# Patient Record
Sex: FEMALE | Race: BLACK OR AFRICAN AMERICAN | NOT HISPANIC OR LATINO | URBAN - METROPOLITAN AREA
[De-identification: names, ages, dates, MRNs, and addresses within clinical notes are randomized per-mention and may not be internally consistent; named-entity substitution may affect disease eponyms.]

---

## 2020-11-19 ENCOUNTER — TELEPHONE ENCOUNTER (OUTPATIENT)
Dept: URBAN - METROPOLITAN AREA CLINIC 113 | Facility: CLINIC | Age: 50
End: 2020-11-19

## 2020-11-19 VITALS — HEIGHT: 63 IN | WEIGHT: 145 LBS | BODY MASS INDEX: 25.69 KG/M2

## 2020-11-19 RX ORDER — POLYETHYLENE GLYCOL 3350, SODIUM CHLORIDE, SODIUM BICARBONATE, POTASSIUM CHLORIDE 420; 11.2; 5.72; 1.48 G/4L; G/4L; G/4L; G/4L
AS DIRECTED POWDER, FOR SOLUTION ORAL
Qty: 1 | Refills: 0 | OUTPATIENT
Start: 2020-11-19 | End: 2020-11-20

## 2020-11-24 ENCOUNTER — LAB OUTSIDE AN ENCOUNTER (OUTPATIENT)
Dept: URBAN - METROPOLITAN AREA CLINIC 113 | Facility: CLINIC | Age: 50
End: 2020-11-24

## 2020-12-02 ENCOUNTER — ERX REFILL RESPONSE (OUTPATIENT)
Dept: URBAN - METROPOLITAN AREA CLINIC 113 | Facility: CLINIC | Age: 50
End: 2020-12-02

## 2020-12-02 RX ORDER — POLYETHYLENE GLYCOL 3350, SODIUM SULFATE, SODIUM CHLORIDE, POTASSIUM CHLORIDE, ASCORBIC ACID, SODIUM ASCORBATE 7.5-2.691G
PLEASE SPECIFY DIRECTIONS, REFILLS AND QUANTITY KIT ORAL
Qty: 1 PACKET | Refills: 0 | OUTPATIENT

## 2020-12-02 RX ORDER — POLYETHYLENE GLYCOL 3350, SODIUM CHLORIDE, SODIUM BICARBONATE, POTASSIUM CHLORIDE 420; 11.2; 5.72; 1.48 G/4L; G/4L; G/4L; G/4L
AS DIRECTED POWDER, FOR SOLUTION ORAL
Qty: 4000 | Refills: 0 | OUTPATIENT

## 2020-12-03 ENCOUNTER — OFFICE VISIT (OUTPATIENT)
Dept: URBAN - METROPOLITAN AREA SURGERY CENTER 25 | Facility: SURGERY CENTER | Age: 50
End: 2020-12-03

## 2020-12-03 ENCOUNTER — ERX REFILL RESPONSE (OUTPATIENT)
Dept: URBAN - METROPOLITAN AREA CLINIC 113 | Facility: CLINIC | Age: 50
End: 2020-12-03

## 2020-12-03 RX ORDER — POLYETHYLENE GLYCOL 3350, SODIUM SULFATE, SODIUM CHLORIDE, POTASSIUM CHLORIDE, ASCORBIC ACID, SODIUM ASCORBATE 7.5-2.691G
PLEASE SPECIFY DIRECTIONS, REFILLS AND QUANTITY KIT ORAL
Qty: 1 | Refills: 0 | OUTPATIENT

## 2020-12-03 RX ORDER — SODIUM SULFATE, POTASSIUM SULFATE, MAGNESIUM SULFATE 17.5; 3.13; 1.6 G/ML; G/ML; G/ML
USE AS DIRECTED FOR PREP SOLUTION, CONCENTRATE ORAL
Qty: 354 MILLILITER | Refills: 0 | OUTPATIENT

## 2020-12-16 ENCOUNTER — TELEPHONE ENCOUNTER (OUTPATIENT)
Dept: URBAN - METROPOLITAN AREA CLINIC 113 | Facility: CLINIC | Age: 50
End: 2020-12-16

## 2020-12-16 ENCOUNTER — OFFICE VISIT (OUTPATIENT)
Dept: URBAN - METROPOLITAN AREA MEDICAL CENTER 19 | Facility: MEDICAL CENTER | Age: 50
End: 2020-12-16
Payer: COMMERCIAL

## 2020-12-16 DIAGNOSIS — Z12.11 COLON CANCER SCREENING: ICD-10-CM

## 2020-12-16 PROCEDURE — 992 APS NON BILLABLE: Performed by: INTERNAL MEDICINE

## 2020-12-16 RX ORDER — SODIUM SULFATE, POTASSIUM SULFATE, MAGNESIUM SULFATE 17.5; 3.13; 1.6 G/ML; G/ML; G/ML
USE AS DIRECTED FOR PREP SOLUTION, CONCENTRATE ORAL
Qty: 354 MILLILITER | Refills: 0 | Status: ACTIVE | COMMUNITY

## 2020-12-16 RX ORDER — POLYETHYLENE GLYCOL 3350, SODIUM SULFATE, SODIUM CHLORIDE, POTASSIUM CHLORIDE, ASCORBIC ACID, SODIUM ASCORBATE 7.5-2.691G
PLEASE SPECIFY DIRECTIONS, REFILLS AND QUANTITY KIT ORAL
Qty: 1 PACKET | Refills: 0 | Status: ACTIVE | COMMUNITY

## 2023-03-22 ENCOUNTER — OFFICE VISIT (OUTPATIENT)
Dept: URBAN - METROPOLITAN AREA CLINIC 107 | Facility: CLINIC | Age: 53
End: 2023-03-22

## 2023-03-31 ENCOUNTER — WEB ENCOUNTER (OUTPATIENT)
Dept: URBAN - METROPOLITAN AREA CLINIC 107 | Facility: CLINIC | Age: 53
End: 2023-03-31

## 2023-03-31 ENCOUNTER — OFFICE VISIT (OUTPATIENT)
Dept: URBAN - METROPOLITAN AREA CLINIC 107 | Facility: CLINIC | Age: 53
End: 2023-03-31
Payer: COMMERCIAL

## 2023-03-31 VITALS
DIASTOLIC BLOOD PRESSURE: 105 MMHG | SYSTOLIC BLOOD PRESSURE: 142 MMHG | HEIGHT: 63 IN | HEART RATE: 85 BPM | BODY MASS INDEX: 19.49 KG/M2 | TEMPERATURE: 98 F | WEIGHT: 110 LBS

## 2023-03-31 DIAGNOSIS — F10.20 ALCOHOL DEPENDENCE, UNCOMPLICATED: ICD-10-CM

## 2023-03-31 DIAGNOSIS — K86.2 PANCREATIC CYST: ICD-10-CM

## 2023-03-31 DIAGNOSIS — F19.10 POLYSUBSTANCE ABUSE: ICD-10-CM

## 2023-03-31 DIAGNOSIS — K86.0 ALCOHOL-INDUCED CHRONIC PANCREATITIS: ICD-10-CM

## 2023-03-31 PROBLEM — 445273005: Status: ACTIVE | Noted: 2023-03-31

## 2023-03-31 PROBLEM — 31258000: Status: ACTIVE | Noted: 2023-03-31

## 2023-03-31 PROBLEM — 154211000119108: Status: ACTIVE | Noted: 2023-03-31

## 2023-03-31 PROBLEM — 66590003: Status: ACTIVE | Noted: 2023-03-31

## 2023-03-31 PROCEDURE — 99204 OFFICE O/P NEW MOD 45 MIN: CPT | Performed by: INTERNAL MEDICINE

## 2023-03-31 RX ORDER — POLYETHYLENE GLYCOL 3350, SODIUM SULFATE, SODIUM CHLORIDE, POTASSIUM CHLORIDE, ASCORBIC ACID, SODIUM ASCORBATE 7.5-2.691G
PLEASE SPECIFY DIRECTIONS, REFILLS AND QUANTITY KIT ORAL
Qty: 1 PACKET | Refills: 0 | Status: ON HOLD | COMMUNITY

## 2023-03-31 RX ORDER — DICYCLOMINE HYDROCHLORIDE 10 MG/1
2 CAPSULES CAPSULE ORAL THREE TIMES A DAY
Status: ACTIVE | COMMUNITY

## 2023-03-31 RX ORDER — PANCRELIPASE 36000; 180000; 114000 [USP'U]/1; [USP'U]/1; [USP'U]/1
AS DIRECTED CAPSULE, DELAYED RELEASE PELLETS ORAL
Qty: 250 | Refills: 4 | OUTPATIENT
Start: 2023-03-31 | End: 2023-08-28

## 2023-03-31 RX ORDER — SODIUM SULFATE, POTASSIUM SULFATE, MAGNESIUM SULFATE 17.5; 3.13; 1.6 G/ML; G/ML; G/ML
USE AS DIRECTED FOR PREP SOLUTION, CONCENTRATE ORAL
Qty: 354 MILLILITER | Refills: 0 | Status: ON HOLD | COMMUNITY

## 2023-03-31 NOTE — HPI-TODAY'S VISIT:
Ms. Love is a 52-year-old female referred from Dr Lindsay at [facility] Internal medicine and [facility] for evaluation of chronic abdominal pain and hospital follow up.  She was scheduled for colonoscopy December 2020 , but this was canceled and she was never seen.   She was admitted to [place] for worsening abdominal pain and was diagnosed with pancreatitis.  She frequents the [place] ER for abdominal pain and back pain.  She has been diagnosed with pancreatitis in the past as well,  but this was the first time she was admitted for it.  She did not see a GI physician, but did see a surgeon.   She has chronic mid abdominal pain and nausea ongoing for the last few years.  She has lost over 40lbs in the last year and went from 160 to 113lbs.   SHe uses a heating pad on her back and has skin discoloration.  SHe otherwise denies vomiting, diarrhea, steatorrhea, change in bowel habits, blood in the stool, fevers or chills. No family history of colon cancer, inflammatory bowel disease GI cancers, peptic ulcer disease or chronic liver disease.  She has reduced her alcohol from 8 cans of beer to 4 cans.  She still drinks liquor.  She smokes 1/2 to 1 ppd.  She is trying to stop using marijuana and cocaine.  SHe had HIV and Hep C testing a few years ago and was reportedly negative.   She is interested in substance abuse rehab.  No prior endoscopies.    Recent CT abdomen pelvis with contrast at [place] 2/13/2023 revealed mild pancreatitis with pancreatic head cyst measuring 3.8 and 2.4 cm, hepatic cyst, mildly dilated bile ducts, gastritis and trace ascites. Labs 2/13/2023 BUN 5, creatinine 0.7, AST 15, ALT 12, alk phos 153, T. bili 0.6, albumin 3.8, glucose 97, lipase 63; WBC 7.4, hemoglobin 12.8, MCV 81, platelets 457

## 2023-03-31 NOTE — PHYSICAL EXAM CONSTITUTIONAL:
thin, chronically ill appearing, well developed, well nourished , in no acute distress , ambulating without difficulty , normal communication ability

## 2023-04-05 ENCOUNTER — CLAIMS CREATED FROM THE CLAIM WINDOW (OUTPATIENT)
Dept: URBAN - METROPOLITAN AREA MEDICAL CENTER 19 | Facility: MEDICAL CENTER | Age: 53
End: 2023-04-05
Payer: COMMERCIAL

## 2023-04-05 DIAGNOSIS — K86.3 PANCREATIC PSEUDOCYST: ICD-10-CM

## 2023-04-05 DIAGNOSIS — D64.89 NORMOCYTIC ANEMIA: ICD-10-CM

## 2023-04-05 DIAGNOSIS — K85.20 ALCOHOL INDUCED ACUTE PANCREATITIS: ICD-10-CM

## 2023-04-05 DIAGNOSIS — F10.10 ALCOHOL ABUSE, UNCOMPLICATED: ICD-10-CM

## 2023-04-05 PROCEDURE — 99222 1ST HOSP IP/OBS MODERATE 55: CPT | Performed by: INTERNAL MEDICINE

## 2023-04-05 PROCEDURE — 99253 IP/OBS CNSLTJ NEW/EST LOW 45: CPT | Performed by: INTERNAL MEDICINE

## 2023-04-12 ENCOUNTER — OFFICE VISIT (OUTPATIENT)
Dept: URBAN - METROPOLITAN AREA CLINIC 113 | Facility: CLINIC | Age: 53
End: 2023-04-12
Payer: COMMERCIAL

## 2023-04-12 VITALS
SYSTOLIC BLOOD PRESSURE: 125 MMHG | DIASTOLIC BLOOD PRESSURE: 78 MMHG | HEART RATE: 77 BPM | TEMPERATURE: 97.5 F | RESPIRATION RATE: 16 BRPM | BODY MASS INDEX: 20.02 KG/M2 | HEIGHT: 63 IN | WEIGHT: 113 LBS

## 2023-04-12 DIAGNOSIS — F19.10 POLYSUBSTANCE ABUSE: ICD-10-CM

## 2023-04-12 DIAGNOSIS — K86.2 PANCREATIC CYST: ICD-10-CM

## 2023-04-12 DIAGNOSIS — K86.0 ALCOHOL-INDUCED CHRONIC PANCREATITIS: ICD-10-CM

## 2023-04-12 DIAGNOSIS — F10.20 ALCOHOL DEPENDENCE, UNCOMPLICATED: ICD-10-CM

## 2023-04-12 PROCEDURE — 99214 OFFICE O/P EST MOD 30 MIN: CPT | Performed by: INTERNAL MEDICINE

## 2023-04-12 RX ORDER — SODIUM SULFATE, POTASSIUM SULFATE, MAGNESIUM SULFATE 17.5; 3.13; 1.6 G/ML; G/ML; G/ML
USE AS DIRECTED FOR PREP SOLUTION, CONCENTRATE ORAL
Qty: 354 MILLILITER | Refills: 0 | Status: ON HOLD | COMMUNITY

## 2023-04-12 RX ORDER — THIAMINE HCL 100 MG
1 TABLET TABLET ORAL ONCE A DAY
Status: ACTIVE | COMMUNITY

## 2023-04-12 RX ORDER — DICYCLOMINE HYDROCHLORIDE 10 MG/1
2 CAPSULES CAPSULE ORAL THREE TIMES A DAY
Status: ON HOLD | COMMUNITY

## 2023-04-12 RX ORDER — PANCRELIPASE 36000; 180000; 114000 [USP'U]/1; [USP'U]/1; [USP'U]/1
AS DIRECTED CAPSULE, DELAYED RELEASE PELLETS ORAL
Qty: 250 | Refills: 4 | OUTPATIENT

## 2023-04-12 RX ORDER — PANCRELIPASE 36000; 180000; 114000 [USP'U]/1; [USP'U]/1; [USP'U]/1
AS DIRECTED CAPSULE, DELAYED RELEASE PELLETS ORAL
Qty: 250 | Refills: 4 | Status: ACTIVE | COMMUNITY
Start: 2023-03-31 | End: 2023-08-28

## 2023-04-12 RX ORDER — FOLIC ACID 1 MG/1
1 TABLET TABLET ORAL ONCE A DAY
Status: ACTIVE | COMMUNITY

## 2023-04-12 RX ORDER — POLYETHYLENE GLYCOL 3350, SODIUM SULFATE, SODIUM CHLORIDE, POTASSIUM CHLORIDE, ASCORBIC ACID, SODIUM ASCORBATE 7.5-2.691G
PLEASE SPECIFY DIRECTIONS, REFILLS AND QUANTITY KIT ORAL
Qty: 1 PACKET | Refills: 0 | Status: ON HOLD | COMMUNITY

## 2023-04-12 NOTE — HPI-TODAY'S VISIT:
Ms. Love is a 52-year-old female initially referred from Dr Lindsay at [facility] Internal medicine and [facility] for evaluation of chronic abdominal pain and hospital follow up.  She was recently admitted to [facility] earlier this month for worsening abdominal pain and sequela of chronic pancreatitis.  MRI abdomen was performed 4/9/23 which revealed a 5.3 cm pseudocyst in the pancreatic head/uncinate process, 1 cm IPMN in the body of the pancreas without suspicious features, intrahepatic biliary dilation secondary to pseudocyst.  CT abdomen pelvis with contrast was also performed 4/4/23 which revealed a lobular cystic structure of the pancreatic head measuring 4.3 x 5.5 x 3.5 cm likely secondary to pseudocyst, moderate intrahepatic biliary dilation likely secondary to compression of the common bile duct by the pseudocyst, 8 mm ill-defined hypoattenuation of the pancreatic body/tail likely fatty invagination versus cystic structure, exophytic uterine fibroids measuring 2.6 cm. She is only using the Creon once daily in the morning.  Her abdominal pain has slightly improved and is somewhat relieved with passing gas.  She is eating a little bit more than prior.  She is still drinking 1 beer daily has tried to cut down on smoking cigarettes and marijuana.  She has been unable to work and will discuss with her PCP regarding disability.  She otherwise denies steatorrhea, blood in the stool, change in bowel habits, nausea or vomiting.  She drinks boost occasionally.  Recent CT abdomen pelvis with contrast at [place] 2/13/2023 revealed mild pancreatitis with pancreatic head cyst measuring 3.8 and 2.4 cm, hepatic cyst, mildly dilated bile ducts, gastritis and trace ascites. Labs 2/13/2023 BUN 5, creatinine 0.7, AST 15, ALT 12, alk phos 153, T. bili 0.6, albumin 3.8, glucose 97, lipase 63; WBC 7.4, hemoglobin 12.8, MCV 81, platelets 457

## 2023-05-31 ENCOUNTER — CLAIMS CREATED FROM THE CLAIM WINDOW (OUTPATIENT)
Dept: URBAN - METROPOLITAN AREA MEDICAL CENTER 19 | Facility: MEDICAL CENTER | Age: 53
End: 2023-05-31
Payer: COMMERCIAL

## 2023-05-31 ENCOUNTER — OFFICE VISIT (OUTPATIENT)
Dept: URBAN - METROPOLITAN AREA CLINIC 113 | Facility: CLINIC | Age: 53
End: 2023-05-31

## 2023-05-31 DIAGNOSIS — D72.829 LEUKOCYTOSIS: ICD-10-CM

## 2023-05-31 DIAGNOSIS — K86.1 OTHER CHRONIC PANCREATITIS: ICD-10-CM

## 2023-05-31 DIAGNOSIS — K85.80 ACUTE VIRAL PANCREATITIS: ICD-10-CM

## 2023-05-31 DIAGNOSIS — K86.3 PSEUDOCYST OF PANCREAS: ICD-10-CM

## 2023-05-31 PROCEDURE — 99222 1ST HOSP IP/OBS MODERATE 55: CPT | Performed by: INTERNAL MEDICINE

## 2023-05-31 PROCEDURE — 99254 IP/OBS CNSLTJ NEW/EST MOD 60: CPT | Performed by: INTERNAL MEDICINE

## 2023-06-01 ENCOUNTER — CLAIMS CREATED FROM THE CLAIM WINDOW (OUTPATIENT)
Dept: URBAN - METROPOLITAN AREA MEDICAL CENTER 19 | Facility: MEDICAL CENTER | Age: 53
End: 2023-06-01
Payer: COMMERCIAL

## 2023-06-01 DIAGNOSIS — K85.80 ACUTE VIRAL PANCREATITIS: ICD-10-CM

## 2023-06-01 DIAGNOSIS — R79.82 ELEVATED C-REACTIVE PROTEIN (CRP): ICD-10-CM

## 2023-06-01 DIAGNOSIS — K86.1 OTHER CHRONIC PANCREATITIS: ICD-10-CM

## 2023-06-01 DIAGNOSIS — K86.3 PSEUDOCYST OF PANCREAS: ICD-10-CM

## 2023-06-01 PROCEDURE — 99232 SBSQ HOSP IP/OBS MODERATE 35: CPT | Performed by: INTERNAL MEDICINE

## 2023-06-16 ENCOUNTER — OFFICE VISIT (OUTPATIENT)
Dept: URBAN - METROPOLITAN AREA CLINIC 113 | Facility: CLINIC | Age: 53
End: 2023-06-16

## 2023-06-16 RX ORDER — FOLIC ACID 1 MG/1
1 TABLET TABLET ORAL ONCE A DAY
Status: ACTIVE | COMMUNITY

## 2023-06-16 RX ORDER — THIAMINE HCL 100 MG
1 TABLET TABLET ORAL ONCE A DAY
Status: ACTIVE | COMMUNITY

## 2023-06-16 RX ORDER — PANCRELIPASE 36000; 180000; 114000 [USP'U]/1; [USP'U]/1; [USP'U]/1
AS DIRECTED CAPSULE, DELAYED RELEASE PELLETS ORAL
Qty: 250 | Refills: 4 | Status: ACTIVE | COMMUNITY

## 2023-06-16 RX ORDER — PANCRELIPASE 36000; 180000; 114000 [USP'U]/1; [USP'U]/1; [USP'U]/1
AS DIRECTED CAPSULE, DELAYED RELEASE PELLETS ORAL
Qty: 250 | Refills: 4 | OUTPATIENT

## 2023-06-16 RX ORDER — DICYCLOMINE HYDROCHLORIDE 10 MG/1
2 CAPSULES CAPSULE ORAL THREE TIMES A DAY
Status: ON HOLD | COMMUNITY

## 2023-06-16 RX ORDER — SODIUM SULFATE, POTASSIUM SULFATE, MAGNESIUM SULFATE 17.5; 3.13; 1.6 G/ML; G/ML; G/ML
USE AS DIRECTED FOR PREP SOLUTION, CONCENTRATE ORAL
Qty: 354 MILLILITER | Refills: 0 | Status: ON HOLD | COMMUNITY

## 2023-06-16 RX ORDER — POLYETHYLENE GLYCOL 3350, SODIUM SULFATE, SODIUM CHLORIDE, POTASSIUM CHLORIDE, ASCORBIC ACID, SODIUM ASCORBATE 7.5-2.691G
PLEASE SPECIFY DIRECTIONS, REFILLS AND QUANTITY KIT ORAL
Qty: 1 PACKET | Refills: 0 | Status: ON HOLD | COMMUNITY

## 2023-06-16 NOTE — HPI-TODAY'S VISIT:
Ms. Love is a 52-year-old female initially referred from Dr Lindsay at [facility] Internal medicine and [facility] for evaluation of chronic abdominal pain and hospital follow up.  She was recently admitted to [facility] earlier this month for worsening abdominal pain and sequela of chronic pancreatitis.  MRI abdomen was performed 4/9/23 which revealed a 5.3 cm pseudocyst in the pancreatic head/uncinate process, 1 cm IPMN in the body of the pancreas without suspicious features, intrahepatic biliary dilation secondary to pseudocyst.  CT abdomen pelvis with contrast was also performed 4/4/23 which revealed a lobular cystic structure of the pancreatic head measuring 4.3 x 5.5 x 3.5 cm likely secondary to pseudocyst, moderate intrahepatic biliary dilation likely secondary to compression of the common bile duct by the pseudocyst, 8 mm ill-defined hypoattenuation of the pancreatic body/tail likely fatty invagination versus cystic structure, exophytic uterine fibroids measuring 2.6 cm. She is only using the Creon once daily in the morning.  Her abdominal pain has slightly improved and is somewhat relieved with passing gas.  She is eating a little bit more than prior.  She is still drinking 1 beer daily has tried to cut down on smoking cigarettes and marijuana.  She has been unable to work and will discuss with her PCP regarding disability.  She otherwise denies steatorrhea, blood in the stool, change in bowel habits, nausea or vomiting.  She drinks boost occasionally.  Recent CT abdomen pelvis with contrast at [place] 2/13/2023 revealed mild pancreatitis with pancreatic head cyst measuring 3.8 and 2.4 cm, hepatic cyst, mildly dilated bile ducts, gastritis and trace ascites. Labs 2/13/2023 BUN 5, creatinine 0.7, AST 15, ALT 12, alk phos 153, T. bili 0.6, albumin 3.8, glucose 97, lipase 63; WBC 7.4, hemoglobin 12.8, MCV 81, platelets 457

## 2023-06-22 ENCOUNTER — DASHBOARD ENCOUNTERS (OUTPATIENT)
Age: 53
End: 2023-06-22

## 2023-06-22 ENCOUNTER — OFFICE VISIT (OUTPATIENT)
Dept: URBAN - METROPOLITAN AREA CLINIC 113 | Facility: CLINIC | Age: 53
End: 2023-06-22
Payer: COMMERCIAL

## 2023-06-22 VITALS
HEART RATE: 90 BPM | DIASTOLIC BLOOD PRESSURE: 86 MMHG | SYSTOLIC BLOOD PRESSURE: 127 MMHG | RESPIRATION RATE: 14 BRPM | TEMPERATURE: 97.6 F | HEIGHT: 63 IN | WEIGHT: 107 LBS | BODY MASS INDEX: 18.96 KG/M2

## 2023-06-22 DIAGNOSIS — F19.10 POLYSUBSTANCE ABUSE: ICD-10-CM

## 2023-06-22 DIAGNOSIS — F10.20 ALCOHOL DEPENDENCE, UNCOMPLICATED: ICD-10-CM

## 2023-06-22 DIAGNOSIS — K86.0 ALCOHOL-INDUCED CHRONIC PANCREATITIS: ICD-10-CM

## 2023-06-22 DIAGNOSIS — K86.2 PANCREATIC CYST: ICD-10-CM

## 2023-06-22 PROCEDURE — 99214 OFFICE O/P EST MOD 30 MIN: CPT | Performed by: INTERNAL MEDICINE

## 2023-06-22 RX ORDER — FOLIC ACID 1 MG/1
1 TABLET TABLET ORAL ONCE A DAY
Status: ACTIVE | COMMUNITY

## 2023-06-22 RX ORDER — PANCRELIPASE 36000; 180000; 114000 [USP'U]/1; [USP'U]/1; [USP'U]/1
AS DIRECTED CAPSULE, DELAYED RELEASE PELLETS ORAL
Qty: 250 | Refills: 4 | OUTPATIENT

## 2023-06-22 RX ORDER — PANCRELIPASE 36000; 180000; 114000 [USP'U]/1; [USP'U]/1; [USP'U]/1
AS DIRECTED CAPSULE, DELAYED RELEASE PELLETS ORAL
Qty: 250 | Refills: 4 | Status: ACTIVE | COMMUNITY

## 2023-06-22 RX ORDER — THIAMINE HCL 100 MG
1 TABLET TABLET ORAL ONCE A DAY
Status: ACTIVE | COMMUNITY

## 2023-06-22 RX ORDER — DICYCLOMINE HYDROCHLORIDE 10 MG/1
2 CAPSULES CAPSULE ORAL THREE TIMES A DAY
Status: ON HOLD | COMMUNITY

## 2023-06-22 RX ORDER — POLYETHYLENE GLYCOL 3350, SODIUM SULFATE, SODIUM CHLORIDE, POTASSIUM CHLORIDE, ASCORBIC ACID, SODIUM ASCORBATE 7.5-2.691G
PLEASE SPECIFY DIRECTIONS, REFILLS AND QUANTITY KIT ORAL
Qty: 1 PACKET | Refills: 0 | Status: ON HOLD | COMMUNITY

## 2023-06-22 RX ORDER — SODIUM SULFATE, POTASSIUM SULFATE, MAGNESIUM SULFATE 17.5; 3.13; 1.6 G/ML; G/ML; G/ML
USE AS DIRECTED FOR PREP SOLUTION, CONCENTRATE ORAL
Qty: 354 MILLILITER | Refills: 0 | Status: ON HOLD | COMMUNITY

## 2023-06-22 NOTE — HPI-TODAY'S VISIT:
Ms. Love is a 52-year-old female here for hospital follow up for acute on chronic pancreatitis and pancreatic pseudocyst.  Her PCP is Dr Lindsay at [facility] Internal medicine.  She had repeat recent CT abdomen pelvis with contrast 5/31/2023 revealed a slight increase in peripancreatic stranding as well as size of fluid collection adjacent to the pancreatic head measuring 4.6 x 6.5 cm, slightly increased mass effect upon the adjacent proximal duodenum, the 1 cm cystic lesion of the body pancreas was better visualized on MRI, stable appearance of main pancreatic duct, simple appearing hepatic cyst measuring 2.9 cm, mild intrahepatic biliary ductal dilation, stable, right middle pulmonary nodule measuring 0.5 cm, increased mesenteric stranding.  creon with all food.   Overall she is doing much better and is compliant with Creon with all meals.  She has only drank once on mothers day.  She is reducing her smoking and smokes 1/3 ppd.  She has lost around 50lbs over the last year, but it has been more stable recently.  Her pain has essentially resolved and denies diarrhea or steatorrhea.  She has not been drinking any boost or ensure.  She was recently admitted to [facility] earlier this month for worsening abdominal pain and sequela of chronic pancreatitis.  MRI abdomen was performed 4/9/23 which revealed a 5.3 cm pseudocyst in the pancreatic head/uncinate process, 1 cm IPMN in the body of the pancreas without suspicious features, intrahepatic biliary dilation secondary to pseudocyst.  CT abdomen pelvis with contrast was also performed 4/4/23 which revealed a lobular cystic structure of the pancreatic head measuring 4.3 x 5.5 x 3.5 cm likely secondary to pseudocyst, moderate intrahepatic biliary dilation likely secondary to compression of the common bile duct by the pseudocyst, 8 mm ill-defined hypoattenuation of the pancreatic body/tail likely fatty invagination versus cystic structure, exophytic uterine fibroids measuring 2.6 cm.   Recent CT abdomen pelvis with contrast at [place] 2/13/2023 revealed mild pancreatitis with pancreatic head cyst measuring 3.8 and 2.4 cm, hepatic cyst, mildly dilated bile ducts, gastritis and trace ascites. Labs 2/13/2023 BUN 5, creatinine 0.7, AST 15, ALT 12, alk phos 153, T. bili 0.6, albumin 3.8, glucose 97, lipase 63; WBC 7.4, hemoglobin 12.8, MCV 81, platelets 457